# Patient Record
Sex: MALE
[De-identification: names, ages, dates, MRNs, and addresses within clinical notes are randomized per-mention and may not be internally consistent; named-entity substitution may affect disease eponyms.]

---

## 2018-08-30 ENCOUNTER — HOSPITAL ENCOUNTER (EMERGENCY)
Dept: HOSPITAL 14 - H.ER | Age: 38
Discharge: HOME | End: 2018-08-30
Payer: SELF-PAY

## 2018-08-30 VITALS
OXYGEN SATURATION: 98 % | DIASTOLIC BLOOD PRESSURE: 94 MMHG | HEART RATE: 98 BPM | SYSTOLIC BLOOD PRESSURE: 146 MMHG | RESPIRATION RATE: 17 BRPM

## 2018-08-30 VITALS — TEMPERATURE: 98 F

## 2018-08-30 DIAGNOSIS — I10: ICD-10-CM

## 2018-08-30 DIAGNOSIS — F10.10: Primary | ICD-10-CM

## 2018-08-30 NOTE — ED PDOC
HPI: Psych/Substance Abuse


Time Seen by Provider: 08/30/18 00:19


Chief Complaint (Nursing): Alcohol Ingestion


Additional Complaint(s): 


37 y/o male brought to the ED by EMS for public intoxication. Patient offers no 

complaints at this time. Patient states he is visiting from [place] and fell 

asleep outside waiting for his friends to arrive. Patient was made to come to 

the ED as per PD and ems. Patient is now requesting to be allowed to go home. 





PMD: None Provided





Past Medical History


Reviewed: Historical Data, Nursing Documentation, Vital Signs


Vital Signs: 





 Last Vital Signs











Temp  98 F   08/30/18 00:11


 


Pulse  98 H  08/30/18 00:36


 


Resp  17   08/30/18 00:36


 


BP  146/94 H  08/30/18 00:36


 


Pulse Ox  98   08/30/18 00:36














- Medical History


PMH: HTN





- Surgical History


Surgical History: No Surg Hx





- Family History


Family History: States: No Known Family Hx





- Social History


Current smoker - smoking cessation education provided: No


Alcohol: Social


Drugs: Denies





- Allergies


Allergies/Adverse Reactions: 


 Allergies











Allergy/AdvReac Type Severity Reaction Status Date / Time


 


No Known Allergies Allergy   Verified 08/30/18 00:11














Review of Systems


ROS Statement: Except As Marked, All Systems Reviewed And Found Negative


Psych: Positive for: Other (Alcohol Intoxication)





Physical Exam





- Physical Exam


Appears: Positive for: Non-toxic, No Acute Distress


Head Exam: Positive for: ATRAUMATIC, NORMOCEPHALIC


Skin: Positive for: Normal Color, Warm, Dry


Eye Exam: Positive for: Normal appearance, EOMI, PERRL


Neck: Positive for: Normal, Painless ROM


Cardiovascular/Chest: Positive for: Regular Rate, Rhythm


Respiratory: Positive for: Normal Breath Sounds.  Negative for: Respiratory 

Distress


Gastrointestinal/Abdominal: Positive for: Normal Exam, Soft.  Negative for: 

Tenderness


Extremity: Positive for: Normal ROM.  Negative for: Deformity


Neurologic/Psych: Positive for: Alert, Oriented (x3), Gait (steady), Other (

Speech is clear).  Negative for: Motor/Sensory Deficits





- ECG


O2 Sat by Pulse Oximetry: 98 (RA)


Pulse Ox Interpretation: Normal





Medical Decision Making


Medical Decision Making: 


Time: 0045


Impression: 37 y/o male with alcohol abuse


Plan:


-- Patient is stable for discharge home with a diagnosis of alcohol use. 





________________________________________________________________________________

_____


Scribe Attestation:


Documented by Jenni Wood acting as a scribe for Rm Kurtz MD.





Provider Scribe Attestation:


All medical record entries made by the Scribe were at my direction and 

personally dictated by me. I have reviewed the chart and agree that the record 

accurately reflects my personal performance of the history, physical exam, 

medical decision making, and the department course for this patient. I have 

also personally directed, reviewed, and agree with the discharge instructions 

and disposition.





Disposition





- Clinical Impression


Clinical Impression: 


 Alcohol use








- Patient ED Disposition


Is Patient to be Admitted: No





- Disposition


Disposition: Routine/Home


Disposition Time: 00:45


Condition: STABLE


Instructions:  Alcohol Use - When Is Drinking a Problem?


Forms:  [facility] (English)